# Patient Record
Sex: MALE | Race: WHITE | NOT HISPANIC OR LATINO | Employment: OTHER | URBAN - NONMETROPOLITAN AREA
[De-identification: names, ages, dates, MRNs, and addresses within clinical notes are randomized per-mention and may not be internally consistent; named-entity substitution may affect disease eponyms.]

---

## 2017-01-25 ENCOUNTER — OFFICE VISIT (OUTPATIENT)
Dept: CARDIOLOGY | Facility: CLINIC | Age: 62
End: 2017-01-25

## 2017-01-25 VITALS
WEIGHT: 244.4 LBS | HEART RATE: 82 BPM | HEIGHT: 75 IN | SYSTOLIC BLOOD PRESSURE: 102 MMHG | OXYGEN SATURATION: 96 % | DIASTOLIC BLOOD PRESSURE: 75 MMHG | BODY MASS INDEX: 30.39 KG/M2

## 2017-01-25 DIAGNOSIS — R06.02 SHORTNESS OF BREATH: ICD-10-CM

## 2017-01-25 DIAGNOSIS — I73.9 INTERMITTENT CLAUDICATION (HCC): ICD-10-CM

## 2017-01-25 DIAGNOSIS — I73.9 PERIPHERAL VASCULAR DISEASE (HCC): ICD-10-CM

## 2017-01-25 DIAGNOSIS — I48.20 CHRONIC ATRIAL FIBRILLATION (HCC): Primary | ICD-10-CM

## 2017-01-25 PROCEDURE — 99213 OFFICE O/P EST LOW 20 MIN: CPT | Performed by: PHYSICIAN ASSISTANT

## 2017-01-25 PROCEDURE — 93000 ELECTROCARDIOGRAM COMPLETE: CPT | Performed by: PHYSICIAN ASSISTANT

## 2017-01-25 NOTE — PROGRESS NOTES
Problem list     Subjective   Franklin Abernathy is a 61 y.o. male     Chief Complaint   Patient presents with   • Follow-up     PRESENTS AS A FOLLOW UP   PROBLEM LIST:   1. Peripheral arterial disease.   1.1. The patient has history [facility], inadequate data.   1.2. Recent arterial duplex demonstrated moderate disease predominantly in the distal SFA  bilaterally approximately 50% to 75%.   1.3. CTA showed occlusion of the distal SFA on the right with reconstitution of distal  popliteal. There was occlusion of the proximal left anterior tibialis arteries.   1.4. CT evaluation recommended medical management by Dr. Pang.  2. Hypertension.   3. Dyslipidemia.   4. Chest pain.  4.1. The patient had stress test in 12/2015 demonstrating no evidence of ischemia and  preserved LV function.   5. Bipolar disorder.   6. Lower extremity edema from venous insufficiency.  7. A fib, new onset.   7.1 CHADS score of 2 (CHF and HTN).   8. History of CHF, per patient.  8.1 Diastolic dysfunction      8.2 Preserved systolic function     HPI  The patient presents back for review.  Mostly, he has done reasonably well since his last appointment here.  He denies angina or anginal equivalent symptoms.  He does not since dysrhythmic activity although he has atrial fibrillation with a rate of 117 buys EKG today.  He has no dizziness or syncope.  I did put him on Pletal at last appointment because of symptomatic vascular disease.  He has had some improvement with regard to claudication symptoms.  He has no PND or orthopnea.  He reports that his recent medications directed towards bipolar disorder [facility].  He feels much improved in that regard.  He has no further complaints otherwise.    Current Outpatient Prescriptions   Medication Sig Dispense Refill   • albuterol (PROAIR HFA) 108 (90 BASE) MCG/ACT inhaler ProAir  (90 Base) MCG/ACT Inhalation Aerosol  Solution; Patient Sig: ProAir  (90 Base) MCG/ACT Inhalation Aerosol Solution ; 8; 0; 07-Mar-2016; Active     • aspirin 81 MG tablet Take 1 tablet by mouth daily.     • bumetanide (BUMEX) 2 MG tablet TAKE ONE TABLET BY MOUTH EVERY DAY 30 tablet 5   • cilostazol (PLETAL) 100 MG tablet Take 1 tablet by mouth 2 (two) times a day. 60 tablet 5   • diazepam (VALIUM) 10 MG tablet Take  by mouth. 1/2 tablet in the AM and 1 tablet at night     • dronedarone (MULTAQ) 400 MG tablet Take 1 tablet by mouth 2 (two) times a day with meals. 60 tablet 5   • isosorbide mononitrate (IMDUR) 30 MG 24 hr tablet TAKE ONE TABLET BY MOUTH EVERY DAY AS DIRECTED 30 tablet 5   • lisinopril (PRINIVIL,ZESTRIL) 10 MG tablet Take 1 tablet by mouth 2 (two) times a day. 180 tablet 3   • lithium carbonate 300 MG capsule Take 2 capsules by mouth 2 (two) times a day. AM & PM     • metoprolol tartrate (LOPRESSOR) 50 MG tablet Take 0.5 tablets by mouth 2 (two) times a day. 60 tablet 5   • nitroglycerin (NITROSTAT) 0.4 MG SL tablet Place  under the tongue.     • omeprazole (PriLOSEC) 20 MG capsule Take 1 capsule by mouth daily.     • risperiDONE (RisperDAL) 1 MG tablet Take 1 tablet by mouth every night.     • rivaroxaban (XARELTO) 20 MG tablet Take 1 tablet by mouth daily with dinner. 30 tablet 5   • simvastatin (ZOCOR) 40 MG tablet Take 1 tablet by mouth every night.     • traZODone (DESYREL) 100 MG tablet Take 2 tablets by mouth every night.       No current facility-administered medications for this visit.        Haloperidol and related    Past Medical History   Diagnosis Date   • Anxiety disorder    • Atrial fibrillation    • Bipolar disorder    • Carotid bruit    • Chest pain    • Coronary artery disease    • Current smoker    • Diastolic dysfunction    • GERD (gastroesophageal reflux disease)    • Hiatal hernia    • Hyperlipidemia    • Hypertension    • Intermittent claudication    • Knee pain    • Lower extremity edema    • Malignant  "neoplasm of skin    • PAD (peripheral artery disease)    • PVD (peripheral vascular disease)    • SOB (shortness of breath)        Social History     Social History   • Marital status:      Spouse name: N/A   • Number of children: N/A   • Years of education: N/A     Occupational History   • Not on file.     Social History Main Topics   • Smoking status: Current Every Day Smoker     Packs/day: 1.00     Years: 48.00   • Smokeless tobacco: Not on file   • Alcohol use Yes      Comment: occasional   • Drug use: Yes     Special: Marijuana   • Sexual activity: Not on file     Other Topics Concern   • Not on file     Social History Narrative       Family History   Problem Relation Age of Onset   • Lung cancer Mother    • Hernia Father      groin   • Hypertension Father    • Lung cancer Father        Review of Systems   Constitutional: Positive for fatigue.   Eyes: Negative.    Respiratory: Positive for shortness of breath.    Cardiovascular: Positive for chest pain and palpitations. Negative for leg swelling.   Gastrointestinal: Negative.    Endocrine: Negative.    Genitourinary: Positive for difficulty urinating.   Musculoskeletal: Positive for arthralgias and myalgias.   Skin: Negative.    Allergic/Immunologic: Negative.    Neurological: Positive for headaches.   Hematological: Bruises/bleeds easily.   Psychiatric/Behavioral: The patient is nervous/anxious.        Objective   Visit Vitals   • /75 (BP Location: Left arm, Patient Position: Sitting)   • Pulse 82   • Ht 75\" (190.5 cm)   • Wt 244 lb 6.4 oz (111 kg)   • SpO2 96%   • BMI 30.55 kg/m2     Lab Results (most recent)     None        Physical Exam   Constitutional: He is oriented to person, place, and time. He appears well-developed and well-nourished. No distress.   HENT:   Head: Normocephalic and atraumatic.   Eyes: Conjunctivae and EOM are normal. Pupils are equal, round, and reactive to light.   Neck: Normal range of motion. Neck supple. No JVD " present. No tracheal deviation present.   Cardiovascular: Normal rate, regular rhythm, normal heart sounds and intact distal pulses.    Pulmonary/Chest: Effort normal and breath sounds normal.   Abdominal: Soft. Bowel sounds are normal. He exhibits no distension and no mass. There is no tenderness. There is no rebound and no guarding.   Musculoskeletal: Normal range of motion. He exhibits no edema, tenderness or deformity.   Neurological: He is alert and oriented to person, place, and time.   Skin: Skin is warm and dry. No rash noted. No erythema. No pallor.   Psychiatric: He has a normal mood and affect. His behavior is normal. Judgment and thought content normal.   Nursing note and vitals reviewed.        Procedure     ECG 12 Lead  Date/Time: 1/25/2017 2:15 PM  Performed by: JENN BURTON  Authorized by: JENN BURTON   Comments: Atrial fibrillation, normal axis, possible septal wall MI age indeterminate, nonspecific ST and T-wave changes without acute changes noted.               Assessment/Plan      Diagnosis Plan   1. Chronic atrial fibrillation  ECG 12 Lead   2. Peripheral vascular disease     3. Intermittent claudication     4. Shortness of breath         I will schedule the patient for an event monitor.  He is on multiple tach which he reports compliance with.  He has A. fib however by EKG.  He does not since dysrhythmic activity.  If he has recurrent atrial fibrillation throughout monitor, I'll likely him going to discontinue Multaq in favor of alternate antidysrhythmic therapy.  For now, I will continue simvastatin therapy, despite his multiple tach therapy.  Ordinarily, I would adjust or change statin therapy altogether because of the drug drug interaction of Multaq and simvastatin.  As there is a possibility that we will be changing neck, I will make no changes for now.  We'll see him back after event monitor and recommend further to him at that time.

## 2017-01-26 DIAGNOSIS — I48.0 PAROXYSMAL ATRIAL FIBRILLATION (HCC): ICD-10-CM

## 2017-01-26 DIAGNOSIS — I48.91 ATRIAL FIBRILLATION (HCC): ICD-10-CM

## 2017-01-27 RX ORDER — DRONEDARONE 400 MG/1
TABLET, FILM COATED ORAL
Qty: 60 TABLET | Refills: 5 | Status: SHIPPED | OUTPATIENT
Start: 2017-01-27 | End: 2017-01-30 | Stop reason: SDUPTHER

## 2017-01-27 RX ORDER — RIVAROXABAN 20 MG/1
TABLET, FILM COATED ORAL
Qty: 30 TABLET | Refills: 5 | Status: SHIPPED | OUTPATIENT
Start: 2017-01-27 | End: 2017-02-09 | Stop reason: SDUPTHER

## 2017-01-30 DIAGNOSIS — I48.0 PAROXYSMAL ATRIAL FIBRILLATION (HCC): ICD-10-CM

## 2017-02-09 ENCOUNTER — OFFICE VISIT (OUTPATIENT)
Dept: CARDIOLOGY | Facility: CLINIC | Age: 62
End: 2017-02-09

## 2017-02-09 VITALS
DIASTOLIC BLOOD PRESSURE: 72 MMHG | HEIGHT: 75 IN | SYSTOLIC BLOOD PRESSURE: 143 MMHG | WEIGHT: 244.8 LBS | OXYGEN SATURATION: 97 % | BODY MASS INDEX: 30.44 KG/M2 | HEART RATE: 66 BPM

## 2017-02-09 DIAGNOSIS — I48.0 PAROXYSMAL ATRIAL FIBRILLATION (HCC): Primary | ICD-10-CM

## 2017-02-09 DIAGNOSIS — R06.02 SHORTNESS OF BREATH: ICD-10-CM

## 2017-02-09 DIAGNOSIS — I73.9 PERIPHERAL VASCULAR DISEASE (HCC): ICD-10-CM

## 2017-02-09 PROCEDURE — 99213 OFFICE O/P EST LOW 20 MIN: CPT | Performed by: PHYSICIAN ASSISTANT

## 2017-02-09 RX ORDER — ARIPIPRAZOLE 5 MG/1
5 TABLET ORAL DAILY
COMMUNITY

## 2017-02-09 RX ORDER — FLECAINIDE ACETATE 50 MG/1
50 TABLET ORAL 2 TIMES DAILY
Qty: 60 TABLET | Refills: 11 | Status: SHIPPED | OUTPATIENT
Start: 2017-02-09

## 2017-02-09 NOTE — PROGRESS NOTES
Problem list     Subjective   Franklin Abernathy is a 61 y.o. male     Chief Complaint   Patient presents with   • Follow-up     2 week event monitor f/u   PROBLEM LIST:   1. Peripheral arterial disease.   1.1. The patient has history of [facility], inadequate data.   1.2. Recent arterial duplex demonstrated moderate disease predominantly in the distal SFA  bilaterally approximately 50% to 75%.   1.3. CTA showed occlusion of the distal SFA on the right with reconstitution of distal  popliteal. There was occlusion of the proximal left anterior tibialis arteries.   1.4. CT evaluation recommended medical management by Dr. Pang.  2. Hypertension.   3. Dyslipidemia.   4. Chest pain.  4.1. The patient had stress test in 12/2015 demonstrating no evidence of ischemia and  preserved LV function.   5. Bipolar disorder.   6. Lower extremity edema from venous insufficiency.  7. A fib, new onset.   7.1 CHADS score of 2 (CHF and HTN).   8. History of CHF, per patient.  8.1 Diastolic dysfunction      8.2 Preserved systolic function     HPI  The patient presents back today for routine evaluation follow-up.  We also wanted to see him back to review event monitor findings.  We scheduled him for an event monitor because of atrial fibrillation noted at prior visit.  He had reported complete compliance with Multaq therapy.  Still, he had episodes of atrial fibrillation.  By monitor, he had recurrent episodes of atrial fibrillation with rapid ventricular response noted.  He reports he does feel tachycardic episodes.  He has no current chest pain.  He has stable dyspnea.  He has no PND or orthopnea.  He has no further complaints otherwise.    Current Outpatient Prescriptions   Medication Sig Dispense Refill   • ARIPiprazole (ABILIFY) 5 MG tablet Take 5 mg by mouth Daily.     • aspirin 81 MG tablet Take 1 tablet by mouth daily.     • bumetanide (BUMEX) 2 MG tablet TAKE ONE TABLET BY  MOUTH EVERY DAY 30 tablet 5   • cilostazol (PLETAL) 100 MG tablet Take 1 tablet by mouth 2 (two) times a day. 60 tablet 5   • isosorbide mononitrate (IMDUR) 30 MG 24 hr tablet TAKE ONE TABLET BY MOUTH EVERY DAY AS DIRECTED 30 tablet 5   • lisinopril (PRINIVIL,ZESTRIL) 10 MG tablet Take 1 tablet by mouth 2 (two) times a day. 180 tablet 3   • lithium carbonate 300 MG capsule Take 2 capsules by mouth 2 (two) times a day. AM & PM     • MELATONIN PO Take 2 tablets by mouth Every Night. Mg unknown     • metoprolol tartrate (LOPRESSOR) 50 MG tablet Take 0.5 tablets by mouth 2 (two) times a day. 60 tablet 5   • omeprazole (PriLOSEC) 20 MG capsule Take 1 capsule by mouth daily.     • rivaroxaban (XARELTO) 20 MG tablet Take 1 tablet by mouth Daily With Dinner. 30 tablet 5   • simvastatin (ZOCOR) 40 MG tablet Take 1 tablet by mouth every night.     • flecainide (TAMBOCOR) 50 MG tablet Take 1 tablet by mouth 2 (Two) Times a Day. 60 tablet 11   • nitroglycerin (NITROSTAT) 0.4 MG SL tablet Place  under the tongue.       No current facility-administered medications for this visit.        Haloperidol and related    Past Medical History   Diagnosis Date   • Anxiety disorder    • Atrial fibrillation    • Bipolar disorder    • Carotid bruit    • Chest pain    • Coronary artery disease    • Current smoker    • Diastolic dysfunction    • GERD (gastroesophageal reflux disease)    • Hiatal hernia    • Hyperlipidemia    • Hypertension    • Intermittent claudication    • Knee pain    • Lower extremity edema    • Malignant neoplasm of skin    • PAD (peripheral artery disease)    • PVD (peripheral vascular disease)    • SOB (shortness of breath)        Social History     Social History   • Marital status:      Spouse name: N/A   • Number of children: N/A   • Years of education: N/A     Occupational History   • Not on file.     Social History Main Topics   • Smoking status: Current Every Day Smoker     Packs/day: 1.00     Years: 48.00  "  • Smokeless tobacco: Not on file   • Alcohol use Yes      Comment: occasional   • Drug use: Yes     Special: Marijuana   • Sexual activity: Not on file     Other Topics Concern   • Not on file     Social History Narrative       Family History   Problem Relation Age of Onset   • Lung cancer Mother    • Hernia Father      groin   • Hypertension Father    • Lung cancer Father        Review of Systems   Constitutional: Positive for fatigue.   HENT:        Ears pop when swallows and stays \"stopped up\"   Eyes: Positive for visual disturbance (reading glasses).   Respiratory: Positive for shortness of breath (mostly at night).    Cardiovascular: Positive for palpitations. Negative for chest pain and leg swelling.   Gastrointestinal: Negative.    Endocrine: Negative.    Genitourinary: Negative.    Musculoskeletal: Positive for arthralgias and myalgias.   Skin: Negative.    Allergic/Immunologic: Negative.    Neurological: Positive for dizziness, light-headedness and headaches.   Hematological: Bruises/bleeds easily.   Psychiatric/Behavioral: Positive for sleep disturbance.       Objective   Visit Vitals   • /72 (BP Location: Left arm, Patient Position: Sitting)   • Pulse 66   • Ht 75\" (190.5 cm)   • Wt 244 lb 12.8 oz (111 kg)   • SpO2 97%   • BMI 30.6 kg/m2     Lab Results (most recent)     None        Physical Exam   Constitutional: He is oriented to person, place, and time. He appears well-developed and well-nourished. No distress.   HENT:   Head: Normocephalic and atraumatic.   Eyes: Conjunctivae and EOM are normal. Pupils are equal, round, and reactive to light.   Neck: Normal range of motion. Neck supple. No JVD present. No tracheal deviation present.   Cardiovascular: Normal rate, regular rhythm, normal heart sounds and intact distal pulses.    Pulmonary/Chest: Effort normal and breath sounds normal.   Abdominal: Soft. Bowel sounds are normal. He exhibits no distension and no mass. There is no tenderness. There " is no rebound and no guarding.   Musculoskeletal: Normal range of motion. He exhibits no edema, tenderness or deformity.   Neurological: He is alert and oriented to person, place, and time.   Skin: Skin is warm and dry. No rash noted. No erythema. No pallor.   Psychiatric: He has a normal mood and affect. His behavior is normal. Judgment and thought content normal.   Nursing note and vitals reviewed.        Procedure   Procedures       Assessment/Plan      Diagnosis Plan   1. Paroxysmal atrial fibrillation     2. Peripheral vascular disease     3. Shortness of breath       The patient will be discontinued from Multaq as that is not treating his atrial fibrillation, all as above.  I will start him on flecainide 50 mg twice a day.  He will need daily EKGs ×3 through his primary care provider.  He will hold Multaq to the weekend and start that on Monday.  We will see him back formally in one month, sooner if indicated by course.  He did have a recent stress test which indicated no    evidence of ischemia.  His prior echo indicated preserved systolic function.  He is a candidate for flecainide in that setting.

## 2017-03-18 ENCOUNTER — HOSPITAL ENCOUNTER (EMERGENCY)
Dept: HOSPITAL 79 - ER1 | Age: 62
LOS: 1 days | Discharge: HOME | End: 2017-03-19
Payer: MEDICARE

## 2017-03-18 DIAGNOSIS — Z88.8: ICD-10-CM

## 2017-03-18 DIAGNOSIS — F17.210: ICD-10-CM

## 2017-03-18 DIAGNOSIS — R53.83: ICD-10-CM

## 2017-03-18 DIAGNOSIS — Z79.01: ICD-10-CM

## 2017-03-18 DIAGNOSIS — I95.9: ICD-10-CM

## 2017-03-18 DIAGNOSIS — I48.2: Primary | ICD-10-CM

## 2017-03-18 DIAGNOSIS — I50.9: ICD-10-CM

## 2017-03-18 DIAGNOSIS — Z95.1: ICD-10-CM

## 2017-03-18 DIAGNOSIS — I11.0: ICD-10-CM

## 2017-03-18 DIAGNOSIS — Z79.899: ICD-10-CM

## 2017-03-18 DIAGNOSIS — R07.89: ICD-10-CM

## 2017-03-18 LAB
BUN/CREATININE RATIO: 26 (ref 0–10)
HGB BLD-MCNC: 16.3 GM/DL (ref 14–17.5)
RED BLOOD COUNT: 5.28 M/UL (ref 4.2–5.5)
WHITE BLOOD COUNT: 10.2 K/UL (ref 4.5–11)

## 2018-07-23 ENCOUNTER — OUTSIDE FACILITY SERVICE (OUTPATIENT)
Dept: CARDIOLOGY | Facility: CLINIC | Age: 63
End: 2018-07-23

## 2018-07-23 PROCEDURE — 93018 CV STRESS TEST I&R ONLY: CPT | Performed by: INTERNAL MEDICINE

## 2018-07-23 PROCEDURE — 78452 HT MUSCLE IMAGE SPECT MULT: CPT | Performed by: INTERNAL MEDICINE
